# Patient Record
Sex: MALE | Race: BLACK OR AFRICAN AMERICAN | Employment: OTHER | URBAN - METROPOLITAN AREA
[De-identification: names, ages, dates, MRNs, and addresses within clinical notes are randomized per-mention and may not be internally consistent; named-entity substitution may affect disease eponyms.]

---

## 2017-01-13 ENCOUNTER — OFFICE VISIT (OUTPATIENT)
Dept: ORTHOPEDIC SURGERY | Age: 72
End: 2017-01-13

## 2017-01-13 VITALS
HEART RATE: 79 BPM | TEMPERATURE: 96.8 F | WEIGHT: 215 LBS | BODY MASS INDEX: 31.84 KG/M2 | HEIGHT: 69 IN | SYSTOLIC BLOOD PRESSURE: 151 MMHG | DIASTOLIC BLOOD PRESSURE: 80 MMHG

## 2017-01-13 DIAGNOSIS — M48.061 FORAMINAL STENOSIS OF LUMBAR REGION: ICD-10-CM

## 2017-01-13 DIAGNOSIS — M48.061 LUMBAR STENOSIS: ICD-10-CM

## 2017-01-13 DIAGNOSIS — M70.71 HIP BURSITIS, RIGHT: ICD-10-CM

## 2017-01-13 DIAGNOSIS — M25.551 HIP PAIN, RIGHT: Primary | ICD-10-CM

## 2017-01-13 RX ORDER — BETAMETHASONE SODIUM PHOSPHATE AND BETAMETHASONE ACETATE 3; 3 MG/ML; MG/ML
6 INJECTION, SUSPENSION INTRA-ARTICULAR; INTRALESIONAL; INTRAMUSCULAR; SOFT TISSUE ONCE
Qty: 1 VIAL | Refills: 0 | Status: CANCELLED
Start: 2017-01-13 | End: 2017-01-13

## 2017-01-13 RX ORDER — MELOXICAM 15 MG/1
15 TABLET ORAL DAILY
Qty: 30 TAB | Refills: 2 | Status: SHIPPED | OUTPATIENT
Start: 2017-01-13 | End: 2022-04-06

## 2017-01-13 NOTE — MR AVS SNAPSHOT
Visit Information Date & Time Provider Department Dept. Phone Encounter #  
 1/13/2017 10:15 AM Judd Martin MD [facility] [phone] [id number] Your Appointments 2/2/2017  1:00 PM  
New Patient with Omar Quiroz MD  
[facility]) Appt Note: RT HIP PAIN  
 [address] [facility] [address]  
[phone]  
  
   
 [address] [facility] [address] Upcoming Health Maintenance Date Due Hepatitis C Screening 1945 DTaP/Tdap/Td series (1 - Tdap) 5/24/1966 FOBT Q 1 YEAR AGE 50-75 5/24/1995 ZOSTER VACCINE AGE 60> 5/24/2005 GLAUCOMA SCREENING Q2Y 5/24/2010 Pneumococcal 65+ Low/Medium Risk (1 of 2 - PCV13) 5/24/2010 MEDICARE YEARLY EXAM 5/24/2010 INFLUENZA AGE 9 TO ADULT 8/1/2016 Allergies as of 1/13/2017  Review Complete On: 1/13/2017 By: Judd Martin MD  
  
 Severity Noted Reaction Type Reactions Nuts [Tree Nut]  01/13/2017    Swelling Pcn [Penicillins]  08/29/2012   Systemic Swelling Current Immunizations  Never Reviewed No immunizations on file. Not reviewed this visit You Were Diagnosed With   
  
 Codes Comments Hip pain, right    -  Primary ICD-10-CM: M25.551 ICD-9-CM: 719.45 Hip bursitis, right     ICD-10-CM: M70.71 ICD-9-CM: 726.5 Lumbar stenosis     ICD-10-CM: M48.06 
ICD-9-CM: 724.02 Foraminal stenosis of lumbar region     ICD-10-CM: M99.83 ICD-9-CM: 724.02 Vitals BP Pulse Temp Height(growth percentile) Weight(growth percentile) BMI  
 151/80 79 96.8 °F (36 °C) (Oral) 5' 9\" (1.753 m) 215 lb (97.5 kg) 31.75 kg/m2 Smoking Status Never Smoker BMI and BSA Data Body Mass Index Body Surface Area 31.75 kg/m 2 2.18 m 2 Preferred Pharmacy Pharmacy Name Phone [phone]  [phone] Your Updated Medication List  
  
   
This list is accurate as of: 1/13/17 11:19 AM.  Always use your most recent med list.  
  
  
  
  
 FLOMAX 0.4 mg capsule Generic drug:  tamsulosin Take 0.4 mg by mouth daily. gabapentin 300 mg capsule Commonly known as:  NEURONTIN Take 1 Cap by mouth nightly. lisinopril 40 mg tablet Commonly known as:  Mollie Ripa Take 40 mg by mouth daily. metFORMIN 1,000 mg tablet Commonly known as:  GLUCOPHAGE Take 1,000 mg by mouth two (2) times daily (with meals). MOBIC 15 mg tablet Generic drug:  meloxicam  
Take 15 mg by mouth daily. PRAVACHOL 40 mg tablet Generic drug:  pravastatin Take 40 mg by mouth nightly. We Performed the Following AMB POC X-RAY RADEX HIP UNI WITH PELVIS 2-3 VIEWS [96488 CPT(R)] Patient Instructions An MRI or CT has been ordered for you. [employer] will be contacting you to schedule the appointment as soon as it has been approved with your insurance company. Please schedule an appointment to follow up with the doctor or the physicians assistant after the MRI or CT has been conducted. Hip Bursitis: Care Instructions Your Care Instructions Bursitis is inflammation of the bursa. A bursa is a small sac of fluid that cushions a joint and helps it move easily. A bursa sits between a bone in the hip and the muscles and tendons in the thigh and buttock. Injury or overuse of the hip can cause bursitis. Activities that can lead to bursitis include twisting and rapid joint movement. Bursitis can cause hip pain. Bursitis usually gets better if you avoid the activity that caused it. If pain lasts or gets worse despite home treatment, your doctor may draw fluid from the bursa through a needle.  This may relieve your pain and help your doctor know if you have an infection. If so, your doctor will prescribe antibiotics. If you have inflammation only, you may get a corticosteroid shot to reduce swelling and pain. Sometimes surgery is needed to drain or remove the bursa. Follow-up care is a key part of your treatment and safety. Be sure to make and go to all appointments, and call your doctor if you are having problems. It's also a good idea to know your test results and keep a list of the medicines you take. How can you care for yourself at home? · Put ice or a cold pack on your hip for 10 to 20 minutes at a time. Put a thin cloth between the ice and your skin. · After 3 days of using ice, you may use heat on your hip. You can use a hot water bottle, a heating pad set on low, or a warm, moist towel. · Rest your hip. Stop any activities that cause pain. Switch to activities that do not stress your hip. · Take your medicines exactly as prescribed. Call your doctor if you think you are having a problem with your medicine. · Ask your doctor if you can take an over-the-counter pain medicine, such as acetaminophen (Tylenol), ibuprofen (Advil, Motrin), or naproxen (Aleve). Be safe with medicines. Read and follow all instructions on the label. · To prevent stiffness, gently move the hip joint as much as you can without pain every day. As the pain gets better, keep doing range-of-motion exercises. Ask your doctor for exercises that will make the muscles around the hip joint stronger. Do these as directed. · You can slowly return to the activity that caused the pain, but do it with less effort until you can do it without pain or swelling. Be sure to warm up before and stretch after you do the activity. When should you call for help? Call your doctor now or seek immediate medical care if: 
· You have a fever. · You have increased swelling or redness in your hip. · You cannot use your hip, or the pain in your hip gets worse. Watch closely for changes in your health, and be sure to contact your doctor if: 
· You have pain for 2 weeks or longer despite home treatment. Where can you learn more? Go to http://abraham-lit.info/. Enter C704 in the search box to learn more about \"Hip Bursitis: Care Instructions. \" Current as of: May 23, 2016 Content Version: 11.1 © 9943-6044 [employer]. Care instructions adapted under license by [employer] (which disclaims liability or warranty for this information). If you have questions about a medical condition or this instruction, always ask your healthcare professional. [address] any warranty or liability for your use of this information. Introducing [facility]! Dear Serene Muniz: Thank you for requesting a [email or web address] account. Our records indicate that you have previously registered for a [email or web address] account but its currently inactive. Please call our [email or web address] support line at [phone]. Additional Information If you have questions, please visit the Frequently Asked Questions section of the [email or web address] website at [email or web address] Remember, [email or web address] is NOT to be used for urgent needs. For medical emergencies, dial 911. Now available from your iPhone and Android! Please provide this summary of care documentation to your next provider. Your primary care clinician is listed as KATERIN TAN-ALAMO. If you have any questions after today's visit, please call [phone].

## 2017-01-13 NOTE — PATIENT INSTRUCTIONS
An MRI or CT has been ordered for you. A [employer] will be contacting you to schedule the appointment as soon as it has been approved with your insurance company. Please schedule an appointment to follow up with the doctor or the physicians assistant after the MRI or CT has been conducted. Hip Bursitis: Care Instructions  Your Care Instructions    Bursitis is inflammation of the bursa. A bursa is a small sac of fluid that cushions a joint and helps it move easily. A bursa sits between a bone in the hip and the muscles and tendons in the thigh and buttock. Injury or overuse of the hip can cause bursitis. Activities that can lead to bursitis include twisting and rapid joint movement. Bursitis can cause hip pain. Bursitis usually gets better if you avoid the activity that caused it. If pain lasts or gets worse despite home treatment, your doctor may draw fluid from the bursa through a needle. This may relieve your pain and help your doctor know if you have an infection. If so, your doctor will prescribe antibiotics. If you have inflammation only, you may get a corticosteroid shot to reduce swelling and pain. Sometimes surgery is needed to drain or remove the bursa. Follow-up care is a key part of your treatment and safety. Be sure to make and go to all appointments, and call your doctor if you are having problems. It's also a good idea to know your test results and keep a list of the medicines you take. How can you care for yourself at home? · Put ice or a cold pack on your hip for 10 to 20 minutes at a time. Put a thin cloth between the ice and your skin. · After 3 days of using ice, you may use heat on your hip. You can use a hot water bottle, a heating pad set on low, or a warm, moist towel. · Rest your hip. Stop any activities that cause pain. Switch to activities that do not stress your hip. · Take your medicines exactly as prescribed.  Call your doctor if you think you are having a problem with your medicine. · Ask your doctor if you can take an over-the-counter pain medicine, such as acetaminophen (Tylenol), ibuprofen (Advil, Motrin), or naproxen (Aleve). Be safe with medicines. Read and follow all instructions on the label. · To prevent stiffness, gently move the hip joint as much as you can without pain every day. As the pain gets better, keep doing range-of-motion exercises. Ask your doctor for exercises that will make the muscles around the hip joint stronger. Do these as directed. · You can slowly return to the activity that caused the pain, but do it with less effort until you can do it without pain or swelling. Be sure to warm up before and stretch after you do the activity. When should you call for help? Call your doctor now or seek immediate medical care if:  · You have a fever. · You have increased swelling or redness in your hip. · You cannot use your hip, or the pain in your hip gets worse. Watch closely for changes in your health, and be sure to contact your doctor if:  · You have pain for 2 weeks or longer despite home treatment. Where can you learn more? Go to http://abraham-lit.info/. Enter M094 in the search box to learn more about \"Hip Bursitis: Care Instructions. \"  Current as of: May 23, 2016  Content Version: 11.1  © 5880-2228 [employer]. Care instructions adapted under license by [employer] (which disclaims liability or warranty for this information). If you have questions about a medical condition or this instruction, always ask your healthcare professional. [place] [address] any warranty or liability for your use of this information.

## 2017-01-13 NOTE — PROGRESS NOTES
Patient: Velvet Weiss                MRN: [id number]       SSN: [id number]  YOB: 1945        AGE: 70 y.o. SEX: male  Body mass index is 31.75 kg/(m^2). 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 He has right hip pain, especially laterally-based. There is not too much in the way of groin pain. He has occasional numbness and tingling going down the legs, especially if he is standing and he has to either flex his spine or sit down for this to improved, especially when he is operating. He denies fevers, chills, night sweats, or weight loss. PHYSICAL EXAMINATION:  On examination today, He is just slightly kyphotic. He actually walks fairly well with no significantly antalgic component to the gait. Both hips rotate nicely. He has tenderness over the greater trochanter. He has mild hip flexor tendinitis as well. There is just a slight decrease of sensation to L4 on the right but mainly intact. No foot drop. Tib/ant and EHL are 5/5. Straight leg raise is equivocal today. The low back is fairly tender at L4-5. The skin is normal.  There is no cyanosis, peripheral edema, or clubbing. The calf is nontender. Cassidy's sign is negative. RADIOGRAPHS:  Review of his x-rays show just slight shadowing in the femoral head, no collapse, and he has significant arthritis of the low lumbar spine. IMPRESSION:  My overall impression is fairly significant bursitis of the right hip, significant spinal stenosis, symptomatic, and he has to sit down or flex his spine to improve, and there is a low likelihood of AVN of the hips. PLAN:  I am going to initially start with a lumbar spine MRI. Hopefully, it will show the hips as well. we can always get a dedicated MRI of the hip. He does not really want an injection in the hip today, although I would be very happy to do it for him.   We are going to start him on Mobic with the usual precautions. I will see him back after the MRI evaluation and Mobic as well. We could consider therapy and injection for the hip. I am not happy with the imaging of the hip on the lumbar spine. We will get a dedicated MRI for the hip as well. REVIEW OF SYSTEMS:      CON: negative for weight loss, fever  EYE: negative for double vision  ENT: negative for hoarseness  RS:   negative for Tb  GI:    negative for blood in stool  :  negative for blood in urine  Other systems reviewed and noted below. Past Medical History   Diagnosis Date    Diabetes ([facility] [address]     Hypercholesterolemia     Hypertension        Family History   Problem Relation Age of Onset    Stroke Mother     Cancer Father      prostate       Current Outpatient Prescriptions   Medication Sig Dispense Refill    gabapentin (NEURONTIN) 300 mg capsule Take 1 Cap by mouth nightly. 30 Cap 11    lisinopril (PRINIVIL, ZESTRIL) 40 mg tablet Take 40 mg by mouth daily.  metFORMIN (GLUCOPHAGE) 1,000 mg tablet Take 1,000 mg by mouth two (2) times daily (with meals).  pravastatin (PRAVACHOL) 40 mg tablet Take 40 mg by mouth nightly.  tamsulosin (FLOMAX) 0.4 mg capsule Take 0.4 mg by mouth daily.  meloxicam (MOBIC) 15 mg tablet Take 15 mg by mouth daily.            Allergies   Allergen Reactions    Nuts [place] Susu Nut] Swelling    Pcn [Penicillins] Swelling       Past Surgical History   Procedure Laterality Date    Hx orthopaedic       right radius surgery       Social History     Social History    Marital status:      Spouse name: N/A    Number of children: N/A    Years of education: N/A     Occupational History    OB/GYN      Social History Main Topics    Smoking status: Never Smoker    Smokeless tobacco: Never Used    Alcohol use Yes    Drug use: No    Sexual activity: Yes     Partners: Female     Other Topics Concern    Not on file     Social History Narrative       Visit Vitals    /80    Pulse 79    Temp 96.8 °F (36 °C) (Oral)    Ht 5' 9\" (1.753 m)    Wt 215 lb (97.5 kg)    BMI 31.75 kg/m2         PHYSICAL EXAMINATION:  GENERAL: Alert and oriented x3, in no acute distress, well-developed, well-nourished, afebrile. HEART: No JVD. EYES: No scleral icterus   NECK: No significant lymphadenopathy   LUNGS: No respiratory compromise or indrawing  ABDOMEN: Soft, non-tender, non-distended. Electronically signed by:  Pastor Sanchez MD

## 2017-01-21 ENCOUNTER — HOSPITAL ENCOUNTER (OUTPATIENT)
Age: 72
Discharge: HOME | End: 2017-01-21
Attending: ORTHOPAEDIC SURGERY
Payer: COMMERCIAL

## 2017-01-21 DIAGNOSIS — M48.061 LUMBAR STENOSIS: ICD-10-CM

## 2017-01-21 PROCEDURE — 72148 MRI LUMBAR SPINE W/O DYE: CPT

## 2017-02-02 ENCOUNTER — OFFICE VISIT (OUTPATIENT)
Dept: ORTHOPEDIC SURGERY | Age: 72
End: 2017-02-02

## 2017-02-02 DIAGNOSIS — M54.9 BACK PAIN, UNSPECIFIED BACK LOCATION, UNSPECIFIED BACK PAIN LATERALITY, UNSPECIFIED CHRONICITY: ICD-10-CM

## 2017-02-02 DIAGNOSIS — M48.061 LUMBAR SPINAL STENOSIS: Primary | ICD-10-CM

## 2017-02-02 RX ORDER — AMLODIPINE BESYLATE 10 MG/1
10 TABLET ORAL EVERY EVENING
Refills: 3 | COMMUNITY
Start: 2016-12-04

## 2017-02-02 RX ORDER — LANSOPRAZOLE 30 MG/1
CAPSULE, DELAYED RELEASE ORAL
Refills: 3 | COMMUNITY
Start: 2016-12-04 | End: 2022-04-06

## 2017-02-02 RX ORDER — ERGOCALCIFEROL 1.25 MG/1
50000 CAPSULE ORAL
Refills: 4 | COMMUNITY
Start: 2016-12-04

## 2017-02-02 NOTE — PROGRESS NOTES
[address], [address]  [place]  Phone: [phone]  Fax: [phone]        Flor Do  : 1945  PCP: Prasad Juan MD      NEW PATIENT      ASSESSMENT AND PLAN     Diagnoses and all orders for this visit:    Lumbar spinal stenosis  -     REFERRAL TO PHYSICAL THERAPY    Back pain, unspecified back location, unspecified back pain laterality, unspecified chronicity  -     REFERRAL TO PHYSICAL THERAPY    1. Exercise and gentle stretching discussed as possible treatment options. 2. Advised to avoid any repetitive bending, lifting, and twisting. 3. No indications for surgery or injections at this time. Will follow clinically. 4. Informed pt to take Mobic PRN. If he does take Mobic, informed pt to take with food. 5. Discussed fall prevention with pt. To do yoga, pilates, or Blake Chi to improve balance. 6. Referral to physical therapy. 7. Given care instructions for lumbar stenosis. Follow-up Disposition:  Return if symptoms worsen or fail to improve. Marina Page MD is seen today in consultation at the request of Dr. Hugh Villanueva for complaints of right hip and low back pain x 2+ years. 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 Pt denies any specific incident or injury that caused their pain. He was seen by Dr. Hugh Villanueva who stated that his pain was mainly coming from his back. His back pain radiates into his RT hip. He reports a short standing tolerance. He is able to reposition himself and use a step stool that will relieve his RT hip pain. He denies his pain interrupting his sleep at night or curtailing most activities. . He denies any constant paresthesia in his BLE. Pt notes that he has played tennis since the age of 6 but lately he has had to decrease his playing time greatly due to his pain.  He has found that he has great difficulty with moving laterally across the tennis court. He has been to physical therapy for his pain but he had an issue with insurance covering his visits. He does note that usually his pain will stay in his low back. He denies any paresthesia in his feet. He has not noticed any balance issues. Denies any recent falls. No weakness in his BLE. He denies any bladder or bowel incontinence but notes that he has some urgency. He has stiffness in his back and BLE. No saddle paresthesia. He was given a Rx for Mobic from Dr. Alessandro Evans that he has not started. Denies persistent fevers, chills, weight changes, neurogenic bowel or bladder symptoms. Pt denies recent ED visits or hospitalizations. He walks three miles in the morning. 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 Recent MRI, positive for stenosis. Pain Assessment  2/2/2017   Location of Pain Back; Hip   Location Modifiers -   Severity of Pain 0   Quality of Pain Sharp;Dull;Aching   Duration of Pain Persistent   Frequency of Pain Constant   Aggravating Factors Bending;Standing;Walking   Limiting Behavior Yes   Relieving Factors Rest;Elevation   Result of Injury No         MRI Results (most recent):    Results from Hospital Encounter encounter on 01/21/17   MRI LUMB SPINE WO CONT   Narrative EXAM: Lumbar MRI without contrast    CLINICAL INDICATION: Right hip pain    TECHNIQUE: MRI of the lumbar spine without contrast obtained. Multiplanar  multisequence MR images of the lumbar spine obtained. IV Contrast: None    COMPARISON: Plain film dated 2/18/2015 and MRI dated 5/25/2010    FINDINGS: All numbering assumes 5 lumbar type vertebrae. The alignment  demonstrates dextrocurvature of the lumbar spine. The marrow signal is normal.  The cord terminates at T12-L1. No cord signal abnormalities appreciated. The abdominal aorta is without evidence of aneurysm. No paraaortic adenopathy  appreciated. The visualized kidneys are unremarkable.     L1-L2 level: Broad-based disc bulge and facet arthropathy are present. This is  mildly progressed since prior imaging. Moderate left neural foraminal narrowing  is present. Mild right neural foraminal narrowing and mild canal narrowing is  noted. L2-L3: Facet arthropathy with disc osteophyte complex. This appears progressed  since prior imaging. Minimal CSF is noted at this level with moderate to severe  canal stenosis. Severe left neural foraminal stenosis is noted. Severe right  neural foraminal stenosis is noted. L3-L4: Facet arthropathy, disc osteophyte complex and ligament flavum thickening  are present. Severe canal stenosis is noted. Severe bilateral neural foraminal  narrowing is present. These findings are progressed since prior imaging. L4-L5: Facet arthropathy especially on the right is noted. This is progressed  since prior imaging. Broad-based disc osteophyte complex is noted. Severe canal  stenosis with severe right lateral recess narrowing and right neural foraminal  narrowing are noted. Moderate to severe left neural foraminal narrowing is  noted. L5-S1: Facet arthropathy is present. Broad-based disc osteophyte complex with  right paracentral osteophyte complex that extends into the right foramen and has  a right lateral osteophyte formation. There is severe right lateral recess  narrowing and moderate canal narrowing. Severe right neural foraminal narrowing  is noted. Moderate to severe left neural foraminal narrowing is noted. This  appears progressed since prior MRI imaging. Impression Impression:  1. Multilevel degenerative disc disease and facet arthropathy which has  progressed since prior MRI. 2.  Severe canal stenosis is noted at L3-L4 and L4-L5     3. Severe bilateral neural foraminal narrowing at L2-L3 and L3-L4. 4.  Severe right neural foraminal narrowing at L4-L5  and L5-S1 with moderate to  severe left neural foraminal narrowing.      5.  Severe right lateral recess narrowing at L5-S1 with a lateral extraforaminal  osteophyte which may abut the exiting nerve root. PAST MEDICAL HISTORY   Past Medical History   Diagnosis Date    Diabetes ([address]     Hypercholesterolemia     Hypertension        Past Surgical History   Procedure Laterality Date    Hx orthopaedic       right radius surgery       MEDICATIONS    Current Outpatient Prescriptions   Medication Sig Dispense Refill    amLODIPine (NORVASC) 10 mg tablet TAKE 1 TABLET BY MOUTH IN THE MORNING  3    ergocalciferol (ERGOCALCIFEROL) 50,000 unit capsule TAKE ONE CAPSULE BY MOUTH WEEKLY  4    lansoprazole (PREVACID) 30 mg capsule TAKE ONE CAPSULE BY MOUTH TWICE A DAY FOR SEVERE REFLUX  3    meloxicam (MOBIC) 15 mg tablet Take 1 Tab by mouth daily. Take with food 30 Tab 2    lisinopril (PRINIVIL, ZESTRIL) 40 mg tablet Take 40 mg by mouth daily.  metFORMIN (GLUCOPHAGE) 1,000 mg tablet Take 1,000 mg by mouth two (2) times daily (with meals).  pravastatin (PRAVACHOL) 40 mg tablet Take 40 mg by mouth nightly.  tamsulosin (FLOMAX) 0.4 mg capsule Take 0.4 mg by mouth daily.  gabapentin (NEURONTIN) 300 mg capsule Take 1 Cap by mouth nightly.  30 Cap 11       ALLERGIES  Allergies   Allergen Reactions    Nuts [Tree Nut] Swelling    Pcn [Penicillins] Swelling          SOCIAL HISTORY    Social History     Social History    Marital status:      Spouse name: N/A    Number of children: N/A    Years of education: N/A     Occupational History    OB/GYN      Social History Main Topics    Smoking status: Never Smoker    Smokeless tobacco: Never Used    Alcohol use Yes    Drug use: No    Sexual activity: Yes     Partners: Female     Other Topics Concern    Not on file     Social History Narrative       FAMILY HISTORY  Family History   Problem Relation Age of Onset    Stroke Mother     Cancer Father      prostate         REVIEW OF SYSTEMS  Review of Systems   Constitutional: Negative for chills, fever and weight loss.   Respiratory: Negative for shortness of breath. Cardiovascular: Negative for chest pain. Gastrointestinal: Negative for constipation. Negative for fecal incontinence   Genitourinary: Negative for dysuria. Negative for urinary incontinence   Musculoskeletal:        Per HPI   Skin: Negative for rash. Neurological: Negative for dizziness, tingling, tremors, focal weakness and headaches. Endo/Heme/Allergies: Does not bruise/bleed easily. Psychiatric/Behavioral: The patient does not have insomnia. PHYSICAL EXAMINATION  There were no vitals taken for this visit. Accompanied by self. Constitutional:  Well developed, well nourished, in no acute distress. Psychiatric: Affect and mood are appropriate. Integumentary: No rashes or abrasions noted on exposed areas. Cardiovascular/Peripheral Vascular: Intact l pulses. No peripheral edema is noted. Lymphatic:  No evidence of lymphedema. No cervical lymphadenopathy. SPINE/MUSCULOSKELETAL EXAM      Lumbar spine:  No rash, ecchymosis, or gross obliquity. No fasciculations. No focal atrophy is noted. Tenderness to palpation RT iliac crest. No tenderness to palpation at the sciatic notch. SI joints non-tender. Trochanters non tender. Sensation grossly intact to light touch. MOTOR:       Hip Flex  Quads Hamstrings Ankle DF EHL Ankle PF   Right +4/5 +4/5 +4/5 +4/5 +4/5 +4/5   Left +4/5 +4/5 +4/5 +4/5 +4/5 +4/5     DTRs are 2+ biceps, triceps, brachioradialis, patella, and Achilles. Straight Leg raise negative. Moderate difficulty with tandem gait. Heel walk intact. Toe rise intact    Ambulation without assistive device. FWB. Written by Marcela Sharp, as dictated by Ron Samson MD.    Mr. Ana Paula Smith may have a reminder for a \"due or due soon\" health maintenance. I have asked that he contact his primary care provider for follow-up on this health maintenance.

## 2017-02-02 NOTE — PATIENT INSTRUCTIONS
Lumbar Stenosis: Care Instructions  Your Care Instructions    Stenosis in the spine is a narrowing of the canal that is around the spinal cord and nerve roots in your back. It can happen as part of aging. Sometimes bone and other tissue grow into this canal and press on the spinal nerves. This can cause pain, numbness, and weakness. When it happens in the lower part of your back, it is called lumbar stenosis. Lumbar stenosis can cause problems in the legs, feet, and rear end (buttocks). You may be able to relieve symptoms of lumbar stenosis with pain medicine. Your doctor may suggest physical therapy and exercises to keep your spine strong and flexible. Some people try cortisone shots to reduce swelling. If pain and numbness in your legs are still so bad that you cannot do your normal activities, you may need surgery. Follow-up care is a key part of your treatment and safety. Be sure to make and go to all appointments, and call your doctor if you are having problems. It's also a good idea to know your test results and keep a list of the medicines you take. How can you care for yourself at home? · Take an over-the-counter pain medicine, such as acetaminophen (Tylenol), ibuprofen (Advil, Motrin), or naproxen (Aleve). Read and follow all instructions on the label. · Do not take two or more pain medicines at the same time unless the doctor told you to. Many pain medicines have acetaminophen, which is Tylenol. Too much acetaminophen (Tylenol) can be harmful. · Stay at a healthy weight. Being overweight puts extra strain on your spine. · Change positions often when you sit or stand. This can ease pain. For example, lean forward. This may reduce pressure on the spinal cord and its nerves. · Avoid doing things that make your symptoms worse. Walking downhill and standing for a long time may cause pain. · Stretch and strengthen your back muscles as your doctor or physical therapist recommends.  If your doctor says it is okay to do them, these exercises may help. ¨ Lie on your back with your knees bent. Gently pull one bent knee to your chest. Put that foot back on the floor, and then pull the other knee to your chest.  ¨ Do pelvic tilts. Lie on your back with your knees bent. Tighten your stomach muscles. Pull your belly button (navel) in and up toward your ribs. You should feel like your back is pressing to the floor and your hips and pelvis are slightly lifting off the floor. Hold for 6 seconds while breathing smoothly. ¨ Stand with your back flat against a wall. Slowly slide down until your knees are slightly bent. Hold for 10 seconds, then slide back up the wall. · Remove or change anything in your house that may cause you to fall. Keep walkways clear of clutter, electrical cords, and throw rugs. When should you call for help? Call 911 anytime you think you may need emergency care. For example, call if:  · You are unable to move a leg at all. Call your doctor now or seek immediate medical care if:  · You have new or worse symptoms in your legs, belly, or buttocks. Symptoms may include:  ¨ Numbness or tingling. ¨ Weakness. ¨ Pain. · You lose bladder or bowel control. Watch closely for changes in your health, and be sure to contact your doctor if:  · You are not getting better as expected. Where can you learn more? Go to http://abraham-lit.info/. Ronaldo Palacios in the search box to learn more about \"Lumbar Stenosis: Care Instructions. \"  Current as of: May 23, 2016  Content Version: 11.1  © 8617-2017 [employer]. Care instructions adapted under license by [employer] (which disclaims liability or warranty for this information). If you have questions about a medical condition or this instruction, always ask your healthcare professional. [address] any warranty or liability for your use of this information.

## 2017-02-23 ENCOUNTER — HOSPITAL ENCOUNTER (OUTPATIENT)
Dept: PHYSICAL THERAPY | Age: 72
Discharge: HOME | End: 2017-02-23
Payer: COMMERCIAL

## 2017-02-23 PROCEDURE — 97110 THERAPEUTIC EXERCISES: CPT

## 2017-02-23 PROCEDURE — 97162 PT EVAL MOD COMPLEX 30 MIN: CPT

## 2017-02-24 NOTE — PROGRESS NOTES
[facility]  [address]  [place], [address]  [phone] fax    Plan of Care/ Statement of Necessity for Physical Therapy Services    Patient name: Yair Hunt MD Start of Care: 2017   Referral source: Suly Tejeda MD : 1945    Medical Diagnosis: Spinal stenosis, lumbar region [M48.06]   Onset Date:Chronic    Treatment Diagnosis: LBP   Prior Hospitalization: see medical history Provider#: [id number]   Medications: Verified on Patient summary List    Comorbidities: HTN, Diabetes, Lumbar stenosis, Arthritis   Prior Level of Function: Able to walk with improved ease and move around first thing in the morning with greater efficiency. The Plan of Care and following information is based on the information from the initial evaluation. Assessment/ key information: The patient is a 70year old male with a chief complaint of R sided lumbar pain that extends down lateral R hip but not below his knee. He reports that this is chronic in nature and that his pain is worse in the morning but does improvement as he moves. He has had a recent MRI which showed stenotic changes through his lumbar spine. He continues to walk 3 miles a day, but does note having discomfort. He has signs and symptoms consistent with mechanical low back pain with associated impairments consisting of pain, decreased ROM, decreased flexibility, and limited strength. He will benefit from skilled PT in order to address the above impairments.      Evaluation Complexity History MEDIUM  Complexity : 1-2 comorbidities / personal factors will impact the outcome/ POC ; Examination LOW Complexity : 1-2 Standardized tests and measures addressing body structure, function, activity limitation and / or participation in recreation  ;Presentation LOW Complexity : Stable, uncomplicated  ;Clinical Decision Making MEDIUM Complexity : FOTO score of 26-74  Overall Complexity Rating: MEDIUM  Problem List: pain affecting function, decrease ROM, decrease strength, decrease ADL/ functional abilitiies, decrease activity tolerance, decrease flexibility/ joint mobility and decrease transfer abilities   Treatment Plan may include any combination of the following: Therapeutic exercise, Therapeutic activities, Neuromuscular re-education, Physical agent/modality, Manual therapy and Patient education  Patient / Family readiness to learn indicated by: asking questions, trying to perform skills and interest  Persons(s) to be included in education: patient (P)  Barriers to Learning/Limitations: None  Patient Goal (s): good mobility  Patient Self Reported Health Status: good  Rehabilitation Potential: good    Short Term Goals: To be accomplished in 2 weeks:   1. The patient will be independent and compliant with HEP to maximize therapeutic benefit. 2. The patient will display negative belén test R hip for improved ease of ambulation. Long Term Goals: To be accomplished in 4 weeks:   1. The patient will improve FOTO score to 69 to maximize quality of life. 2. The patient will improve hip ABD strength to 4/5 MMT to maximize stability during stance. 3. The patient will demonstrate NO tests to 15 degrees tibial shaft from horizontal to reduce stress of hip.    4. The patient will improve Ely test to 100 degrees bilaterally for reducing stress through lumbar spine during ambulation. Frequency / Duration: Patient to be seen 2 times per week for 4 weeks. Patient/ Caregiver education and instruction: Diagnosis, prognosis, self care, activity modification and exercises   [x]  Plan of care has been reviewed with MEJIA Soto, PT 2/23/2017 7:10 PM    ________________________________________________________________________    I certify that the above Therapy Services are being furnished while the patient is under my care.  I agree with the treatment plan and certify that this therapy is francisco.     [address] Signature:____________________  Date:____________Time: _________    Please sign and return to [facility]  [address] [facility] [address]  [place], [address]  [phone] fax

## 2017-02-24 NOTE — PROGRESS NOTES
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 MD  Date:2017  : 1945  [x]  Patient  Verified  Payor: [employer] [place] / Plan: Flores Moreno HMO / Product Type: HMO /    In time:5:15  Out time:5:55  Total Treatment Time (min): 40  Visit #: 1 of 8    Treatment Area: Spinal stenosis, lumbar region [M48.06]    SUBJECTIVE  Pain Level (0-10 scale): 0/10  Any medication changes, allergies to medications, adverse drug reactions, diagnosis change, or new procedure performed?: [x] No    [] Yes (see summary sheet for update)  Subjective functional status/changes:   [] No changes reported  The patient states that he has a chief complaint of R sided back pain that does extend into his left hip.      OBJECTIVE  Modality rationale:  to improve the patients ability to    Min Type Additional Details    [] Estim:  []Unatt       []IFC  []Premod                        []Other:  []w/ice   []w/heat  Position:  Location:    [] Estim: []Att    []TENS instruct  []NMES                    []Other:  []w/US   []w/ice   []w/heat  Position:  Location:    []  Traction: [] Cervical       []Lumbar                       [] Prone          []Supine                       []Intermittent   []Continuous Lbs:  [] before manual  [] after manual    []  Ultrasound: []Continuous   [] Pulsed                           []1MHz   []3MHz W/cm2:  Location:    []  Iontophoresis with dexamethasone         Location: [] Take home patch   [] In clinic    []  Ice     []  heat  []  Ice massage  []  Laser   []  Anodyne Position:  Location:    []  Laser with stim  []  Other:  Position:  Location:    []  Vasopneumatic Device Pressure:       [] lo [] med [] hi   Temperature: [] lo [] med [] hi   [] Skin assessment post-treatment:  []intact []redness- no adverse reaction    []redness - adverse reaction:     30 min []Eval                  []Re-Eval       10 min Therapeutic Exercise:  [x] See flow sheet :   Rationale: increase ROM and increase strength to improve the patients ability to improve ADL ease. With   [] TE   [] TA   [] neuro   [] other: Patient Education: [x] Review HEP    [] Progressed/Changed HEP based on:   [] positioning   [] body mechanics   [] transfers   [] heat/ice application    [] other:      Other Objective/Functional Measures: See IE     Pain Level (0-10 scale) post treatment: 0/10    ASSESSMENT/Changes in Function: See POC. Patient will continue to benefit from skilled PT services to modify and progress therapeutic interventions, address functional mobility deficits, address ROM deficits, address strength deficits, analyze and address soft tissue restrictions, analyze and cue movement patterns, analyze and modify body mechanics/ergonomics, assess and modify postural abnormalities and instruct in home and community integration to attain remaining goals. [x]  See Plan of Care  []  See progress note/recertification  []  See Discharge Summary         Progress towards goals / Updated goals:  Short Term Goals: To be accomplished in 2 weeks:  1. The patient will be independent and compliant with HEP to maximize therapeutic benefit. 2. The patient will display negative belén test R hip for improved ease of ambulation. Long Term Goals: To be accomplished in 4 weeks:  1. The patient will improve FOTO score to 69 to maximize quality of life. 2. The patient will improve hip ABD strength to 4/5 MMT to maximize stability during stance. 3. The patient will demonstrate NO tests to 15 degrees tibial shaft from horizontal to reduce stress of hip.  4. The patient will improve Ely test to 100 degrees bilaterally for reducing stress through lumbar spine during ambulation.     PLAN  []  Upgrade activities as tolerated     [x]  Continue plan of care  []  Update interventions per flow sheet       []  Discharge due to:_  []  Other:_      Greg Denise, PT 2/23/2017  7:20 PM    Future Appointments  Date Time Provider Ida Martin 2/27/2017 5:00 PM Rosa Maria Cortes, PTA [facility]   3/2/2017 5:30 PM Hari Barba, PT [facility]   3/7/2017 5:00 PM Hari Barba, PT [facility]   3/9/2017 5:00 PM Green Hurst [facility]   3/13/2017 5:00 PM Rosa Maria Cortes, MEJIA [facility]   3/16/2017 5:00 PM [address] [facility]   3/21/2017 5:00 PM [address] [facility]   3/24/2017 5:00 PM Polo Thomas, PT [facility]

## 2017-03-09 ENCOUNTER — APPOINTMENT (OUTPATIENT)
Dept: PHYSICAL THERAPY | Age: 72
End: 2017-03-09

## 2017-03-13 ENCOUNTER — APPOINTMENT (OUTPATIENT)
Dept: PHYSICAL THERAPY | Age: 72
End: 2017-03-13

## 2017-03-16 ENCOUNTER — APPOINTMENT (OUTPATIENT)
Dept: PHYSICAL THERAPY | Age: 72
End: 2017-03-16

## 2017-03-21 ENCOUNTER — APPOINTMENT (OUTPATIENT)
Dept: PHYSICAL THERAPY | Age: 72
End: 2017-03-21

## 2017-03-24 ENCOUNTER — APPOINTMENT (OUTPATIENT)
Dept: PHYSICAL THERAPY | Age: 72
End: 2017-03-24

## 2017-03-24 NOTE — PROGRESS NOTES
[facility]  [address]  [facility], [address]  [phone] fax    Physical Therapy Discharge Summary  Patient name: Flora Yoder MD Start of Care: 2017   Referral source: Janene Curry MD : 1945    Medical Diagnosis: Spinal stenosis, lumbar region [M48.06] Onset Date:Chronic    Treatment Diagnosis: LBP   Prior Hospitalization: see medical history Provider#: [id number]   Medications: Verified on Patient summary List   Comorbidities: HTN, Diabetes, Lumbar stenosis, Arthritis  Prior Level of Function: Able to walk with improved ease and move around first thing in the morning with greater efficiency. Visits from Start of Care: 1    Missed Visits: 3  Reporting Period : 2017 to 2017    Summary of Care:  Short Term Goals: To be accomplished in 2 weeks:  1. The patient will be independent and compliant with HEP to maximize therapeutic benefit. 2. The patient will display negative belén test R hip for improved ease of ambulation. Long Term Goals: To be accomplished in 4 weeks:  1. The patient will improve FOTO score to 69 to maximize quality of life. 2. The patient will improve hip ABD strength to 4/5 MMT to maximize stability during stance. 3. The patient will demonstrate NO tests to 15 degrees tibial shaft from horizontal to reduce stress of hip.  4. The patient will improve Ely test to 100 degrees bilaterally for reducing stress through lumbar spine during ambulation. ASSESSMENT/RECOMMENDATIONS:  Unable to further assess progress towards goals at this time due to non-compliance/lack of attendance. DC at this time with no further instructions to the patient.   Thank you for this referral.    [x]Discontinue therapy: []Patient has reached or is progressing toward set goals      [x]Patient is non-compliant or has abdicated      []Due to lack of appreciable progress towards set [address], PT 3/24/2017 11:24 AM